# Patient Record
Sex: FEMALE | Race: AMERICAN INDIAN OR ALASKA NATIVE
[De-identification: names, ages, dates, MRNs, and addresses within clinical notes are randomized per-mention and may not be internally consistent; named-entity substitution may affect disease eponyms.]

---

## 2018-12-27 ENCOUNTER — HOSPITAL ENCOUNTER (EMERGENCY)
Dept: HOSPITAL 42 - ED | Age: 33
LOS: 1 days | Discharge: HOME | End: 2018-12-28
Payer: COMMERCIAL

## 2018-12-27 VITALS — BODY MASS INDEX: 42.9 KG/M2

## 2018-12-27 DIAGNOSIS — R07.89: ICD-10-CM

## 2018-12-27 DIAGNOSIS — R51: Primary | ICD-10-CM

## 2018-12-27 PROCEDURE — 99285 EMERGENCY DEPT VISIT HI MDM: CPT

## 2018-12-27 PROCEDURE — 96375 TX/PRO/DX INJ NEW DRUG ADDON: CPT

## 2018-12-27 PROCEDURE — 93005 ELECTROCARDIOGRAM TRACING: CPT

## 2018-12-27 PROCEDURE — 96374 THER/PROPH/DIAG INJ IV PUSH: CPT

## 2018-12-27 PROCEDURE — 83735 ASSAY OF MAGNESIUM: CPT

## 2018-12-27 PROCEDURE — 85025 COMPLETE CBC W/AUTO DIFF WBC: CPT

## 2018-12-27 PROCEDURE — 84484 ASSAY OF TROPONIN QUANT: CPT

## 2018-12-27 PROCEDURE — 80053 COMPREHEN METABOLIC PANEL: CPT

## 2018-12-27 PROCEDURE — 71045 X-RAY EXAM CHEST 1 VIEW: CPT

## 2018-12-27 NOTE — ED PDOC
Arrival/HPI





<Parker Howell - Last Filed: 12/28/18 00:54>





- General


Historian: Patient





- History of Present Illness


Narrative History of Present Illness (Text): 





12/27/18 23:42


34 y/o female, pmh including chronic headache, nkda, c/o headache and chest pain

x 2 days.  Pt. stated that she hasn't sleeping well as she is trying to cut down

her coffee lately, unable to sleep, had chest pain 2 days ago with the headache,

no night sweat, resolved, no palpitation, no change in vision, no numbness or 

tingling, not the worst headache of her life, no other medical or psychological 

complaints. 





<Osmany Howell - Last Filed: 12/28/18 02:57>





- General


Chief Complaint: Headache





Past Medical History





- Provider Review


Nursing Documentation Reviewed: Yes





- Infectious Disease


Hx of Infectious Diseases: None





- Tetanus Immunization


Tetanus Immunization: Up to Date





- Past Medical History


Past Medical History: No Previous





- Cardiac


Hx Cardiac Disorders: No





- Pulmonary


Hx Respiratory Disorders: No





- Neurological


Hx Neurological Disorder: No





- HEENT


Hx HEENT Disorder: No





- Renal


Hx Renal Disorder: No





- Endocrine/Metabolic


Hx Endocrine Disorders: No





- Integumentary


Hx Dermatological Disorder: Yes (hyperdermatitis)





- Musculoskeletal/Rheumatological


Hx Musculoskeletal Disorders: No


Hx Falls: No





- Genitourinary/Gynecological


Hx Genitourinary Disorders: No





- Psychiatric


Hx Psychophysiologic Disorder: No


Hx Substance Use: No





- Surgical History


Hx Gastric Bypass Surgery: Yes (LAP BAND INSERTION AND REMOVAL)





- Anesthesia


Hx Anesthesia: Yes


Hx Anesthesia Reactions: No


Hx Malignant Hyperthermia: No





- Suicidal Assessment


Feels Threatened In Home Enviroment: No





<Osmany Howell - Last Filed: 12/28/18 02:57>





Family/Social History





- Physician Review


Nursing Documentation Reviewed: Yes


Family/Social History: Unknown Family HX


Smoking Status: Never Smoked


Hx Alcohol Use: No


Hx Substance Use: No


Hx Substance Use Treatment: No





<Osmany Howell - Last Filed: 12/28/18 02:57>





Allergies/Home Meds





<Parker Howell - Last Filed: 12/28/18 00:54>





<Osmany Howell - Last Filed: 12/28/18 02:57>


Allergies/Adverse Reactions: 


Allergies





shellfish derived Allergy (Intermediate, Verified 12/27/18 23:26)


   RASH











Review of Systems





- Review of Systems


Constitutional: absent: Fatigue, Fevers


Eyes: absent: Vision Changes


ENT: absent: Hearing Changes


Respiratory: absent: SOB, Cough


Cardiovascular: Chest Pain


Gastrointestinal: absent: Abdominal Pain, Constipation, Diarrhea, Nausea, 

Vomiting


Musculoskeletal: absent: Arthralgias, Back Pain


Skin: absent: Rash, Pruritis, Skin Lesions


Neurological: Headache.  absent: Dizziness, Focal Weakness, Gait Changes


Psychiatric: absent: Anxiety, Depression, Suicidal Ideation





<Osmany Howell - Last Filed: 12/28/18 02:57>





Physical Exam





- Systems Exam


Head: Present: Atraumatic, Normocephalic, Other (no temporal artery tenderness 

or jaw claudication).  No: Tenderness, Contusion, Swelling, Ecchymosis, 

Abrasion, Laceration


Pupils: Present: PERRL


Extroacular Muscles: Present: EOMI


Conjunctiva: Present: Normal


Ears: Present: NORMAL TM, Normal Canal.  No: Erythema


Mouth: Present: Moist Mucous Membranes


Pharnyx: No: ERYTHEMA, EXUDATE, TONSILS ENLARGED


Nose (External): Present: Atraumatic.  No: Abrasion, Contusion, Laceration


Nose (Internal): Present: Normal Inspection, No Active Bleeding.  No: Clear 

Mucous, Rhinorrhea, Septal Hematoma, Epistaxis


Neck: Present: Normal Range of Motion, Trachea Midline.  No: Meningeal Signs, 

MIDLINE TENDERNESS, Paraspinal Tenderness, Lymphadenopathy


Respiratory/Chest: Present: Clear to Auscultation, Good Air Exchange.  No: 

Respiratory Distress, Accessory Muscle Use, Wheezes, Decreased Breath Sounds, 

Retracting, Rhonchi, Tachypneic, Tender to Palpation


Cardiovascular: Present: Regular Rate and Rhythm, Normal S1, S2.  No: Murmurs


Abdomen: No: Tenderness, Distention, Peritoneal Signs, Rebound, Guarding


Back: Present: Normal Inspection


Upper Extremity: Present: Normal Inspection.  No: Cyanosis, Edema


Lower Extremity: Present: Normal Inspection.  No: Edema


Neurological: Present: GCS=15, CN II-XII Intact, Speech Normal, Motor Func 

Grossly Intact, Normal Cerebellar Funct, Gait Normal, Memory Normal, Other 

(normal finger to nose test, normal heel to shin test. )


Skin: Present: Warm, Dry, Normal Color.  No: Rashes


Psychiatric: Present: Alert, Oriented x 3, Normal Insight, Normal Concentration





<LyndaOsmany FRANCES - Last Filed: 12/28/18 02:57>





Medical Decision Making





- RAD Interpretation


Radiology Orders: 











12/27/18 23:41


CHEST PORTABLE [RAD] Stat 














- Medication Orders


Current Medication Orders: 














Discontinued Medications





Diphenhydramine HCl (Benadryl)  50 mg IVP STAT STA


   Stop: 12/27/18 23:42


Sodium Chloride (Sodium Chloride 0.9%)  500 mls @ 999 mls/hr IV .Q31M STA


   Stop: 12/28/18 00:11


Ketorolac Tromethamine (Toradol)  30 mg IVP STAT STA


   Stop: 12/27/18 23:42


Metoclopramide HCl (Reglan)  10 mg IVP STAT STA


   Stop: 12/27/18 23:42











<Parker Howell - Last Filed: 12/28/18 00:54>


ED Course and Treatment: 





12/27/18 23:48


-labs


-chest xray


-ekg


-IVF/toradol/reglan/benadryl


-Observe and reassess





12/28/18 02:46


-EKG: NSR @ 77 BPM, no ST elevation or depression, no T wave inversion. 


-CXR ER wet read show no active disease


-Labs show no acute findings except wbc 12.0 (no fever or chills)


-Mg within normal limit


-Trop after 24 hours is negative


-Pt. feels completely relief, asymptomatic, request to be discharged home.


-I offered CT head but the patient refused as her headache resolved, no focal 

neurological deficits. 


-Discharge home with pepcid, fioriocet, stay hydrated, follow up with your own 

pmd and cardiologist/neurologist within 2 days, return to the ER for any new or 

worsening signs or symptoms. 

















- EKG Interpretation


EKG Interpretation (Text): 





12/28/18 00:21


EKG: NSR @ 77 BPM, no ST elevation or depression, no T wave inversion. 


Interpreted by ED Physician: Yes


Type: 12 lead EKG





- Medication Orders


Current Medication Orders: 











Diphenhydramine HCl (Benadryl)  50 mg IVP STAT STA


   Stop: 12/27/18 23:42


Sodium Chloride (Sodium Chloride 0.9%)  500 mls @ 999 mls/hr IV .Q31M STA


   Stop: 12/28/18 00:11


Ketorolac Tromethamine (Toradol)  30 mg IVP STAT STA


   Stop: 12/27/18 23:42


Metoclopramide HCl (Reglan)  10 mg IVP STAT STA


   Stop: 12/27/18 23:42











<Osmany Howell - Last Filed: 12/28/18 02:57>





- PA / NP / Resident Statement


ROSA has reviewed & agrees with the documentation as recorded.


ROSA has examined the patient and agrees with the treatment plan.





<Parker Howell - Last Filed: 12/28/18 00:54>





- PA / NP / Resident Statement


ROSA has reviewed & agrees with the documentation as recorded.


ROSA has examined the patient and agrees with the treatment plan.





<Osmany Howell - Last Filed: 12/28/18 02:57>





Disposition/Present on Arrival





<Parker Howell - Last Filed: 12/28/18 00:54>





- Present on Arrival


Any Indicators Present on Arrival: No


History of DVT/PE: No


History of Uncontrolled Diabetes: No


Urinary Catheter: Yes


History of Decub. Ulcer: No


History Surgical Site Infection Following: None





- Disposition


Have Diagnosis and Disposition been Completed?: Yes


Disposition Time: 02:50


Patient Plan: Discharge





<Osmany Howell - Last Filed: 12/28/18 02:57>





- Disposition


Diagnosis: 


 Headache, Atypical chest pain





Disposition: HOME/ ROUTINE


Condition: IMPROVED


Discharge Instructions (ExitCare):  Chest Pain (ED)


Additional Instructions: 


-Discharge home with pepcid, fioriocet, stay hydrated, follow up with your own 

pmd and cardiologist/neurologist within 2 days, return to the ER for any new or 

worsening signs or symptoms. 


Prescriptions: 


Acetaminophen/Butalbital/Caf [Fioricet] 1 tab PO QID PRN #30 tab


 PRN Reason: Other


Famotidine [Pepcid] 20 mg PO BID #30 tab


Referrals: 


[facility] [place] up with primary


Grant Hurtado MD [Staff Provider] - Follow up with primary


Linda Acosta MD [Staff Provider] - Follow up with primary


Forms:  [employer] (English), WORK NOTE

## 2018-12-28 VITALS — OXYGEN SATURATION: 99 % | HEART RATE: 74 BPM | DIASTOLIC BLOOD PRESSURE: 70 MMHG | SYSTOLIC BLOOD PRESSURE: 115 MMHG

## 2018-12-28 VITALS — RESPIRATION RATE: 16 BRPM | TEMPERATURE: 98.2 F

## 2018-12-28 LAB
ALBUMIN SERPL-MCNC: 3.7 G/DL (ref 3–4.8)
ALBUMIN/GLOB SERPL: 1 {RATIO} (ref 1.1–1.8)
ALT SERPL-CCNC: 23 U/L (ref 7–56)
AST SERPL-CCNC: 19 U/L (ref 14–36)
BASOPHILS # BLD AUTO: 0.02 K/MM3 (ref 0–2)
BASOPHILS NFR BLD: 0.2 % (ref 0–3)
BUN SERPL-MCNC: 14 MG/DL (ref 7–21)
CALCIUM SERPL-MCNC: 8.5 MG/DL (ref 8.4–10.5)
EOSINOPHIL # BLD: 0.1 10*3/UL (ref 0–0.7)
EOSINOPHIL NFR BLD: 1.2 % (ref 1.5–5)
ERYTHROCYTE [DISTWIDTH] IN BLOOD BY AUTOMATED COUNT: 14.5 % (ref 11.5–14.5)
GFR NON-AFRICAN AMERICAN: > 60
GRANULOCYTES # BLD: 8.05 10*3/UL (ref 1.4–6.5)
GRANULOCYTES NFR BLD: 67.1 % (ref 50–68)
HGB BLD-MCNC: 10.7 G/DL (ref 12–16)
LYMPHOCYTES # BLD: 3.1 10*3/UL (ref 1.2–3.4)
LYMPHOCYTES NFR BLD AUTO: 25.9 % (ref 22–35)
MCH RBC QN AUTO: 28.9 PG (ref 25–35)
MCHC RBC AUTO-ENTMCNC: 33.8 G/DL (ref 31–37)
MCV RBC AUTO: 85.7 FL (ref 80–105)
MONOCYTES # BLD AUTO: 0.7 10*3/UL (ref 0.1–0.6)
MONOCYTES NFR BLD: 5.6 % (ref 1–6)
PLATELET # BLD: 340 10^3/UL (ref 120–450)
PMV BLD AUTO: 10.7 FL (ref 7–11)
RBC # BLD AUTO: 3.7 10^6/UL (ref 3.5–6.1)
TROPONIN I SERPL-MCNC: < 0.01 NG/ML
WBC # BLD AUTO: 12 10^3/UL (ref 4.5–11)

## 2018-12-28 NOTE — CARD
--------------- APPROVED REPORT --------------





Date of service: 12/28/2018



EKG Measurement

Heart [id number]

MD [id number]

[id number]

[id number]

[id number]



<Conclusion>

Normal sinus rhythm

Normal ECG

## 2018-12-28 NOTE — RAD
Date of service: 



12/28/2018



HISTORY:

 medical clearance 



COMPARISON:

No prior. 



FINDINGS:



LUNGS:

No active pulmonary disease.



PLEURA:

No significant pleural effusion identified, no pneumothorax apparent.



CARDIOVASCULAR:

No aortic atherosclerotic calcification present.



Normal cardiac size. No pulmonary vascular congestion. 



OSSEOUS STRUCTURES:

No significant abnormalities.



VISUALIZED UPPER ABDOMEN:

Normal.



OTHER FINDINGS:

None.



IMPRESSION:

No active disease.